# Patient Record
Sex: FEMALE | Race: BLACK OR AFRICAN AMERICAN
[De-identification: names, ages, dates, MRNs, and addresses within clinical notes are randomized per-mention and may not be internally consistent; named-entity substitution may affect disease eponyms.]

---

## 2017-04-22 ENCOUNTER — HOSPITAL ENCOUNTER (OUTPATIENT)
Dept: HOSPITAL 61 - MAMMO | Age: 62
Discharge: HOME | End: 2017-04-22
Attending: FAMILY MEDICINE
Payer: COMMERCIAL

## 2017-04-22 DIAGNOSIS — Z12.31: Primary | ICD-10-CM

## 2017-04-24 NOTE — RAD
EXAM: DIGITAL SCREEN BILAT W/CAD.



HISTORY: Screening.



COMPARISON: 12/29/2014.



FINDINGS:



Digital mammography was performed. Computer-aided detection (CAD) was

utilized.



The breast parenchyma is heterogeneously dense, which could reduce sensitivity

of mammography (tissue density C). No dominant suspicious mass, suspicious

microcalcifications, or architectural distortion is identified.



   



IMPRESSION: No mammographic evidence of malignancy.







BI-RADS CATEGORY: 1 NEGATIVE



RECOMMENDED FOLLOW-UP: 12M 12 MONTH FOLLOW-UP



PQRS compliance statement: Patient information was entered into a reminder

system with a target due date     for the next mammogram.



Mammography is a sensitive method for finding small breast cancers, but it

does not detect them all and is not a substitute for careful clinical

examination.  A negative mammogram does not negate a clinically suspicious

finding and should not result in delay in biopsying a clinically suspicious

abnormality.



"Our facility is accredited by the [employer] Mammography

Program."

## 2018-08-18 ENCOUNTER — HOSPITAL ENCOUNTER (OUTPATIENT)
Dept: HOSPITAL 61 - MAMMO | Age: 63
Discharge: HOME | End: 2018-08-18
Attending: FAMILY MEDICINE
Payer: COMMERCIAL

## 2018-08-18 DIAGNOSIS — Z12.31: Primary | ICD-10-CM

## 2018-08-18 PROCEDURE — 77067 SCR MAMMO BI INCL CAD: CPT

## 2018-08-21 NOTE — RAD
DATE: 8/18/2018



EXAM: DIGITAL SCREEN BILAT W/CAD



HISTORY: Routine screening



COMPARISON: 4/22/2017



This study was interpreted with the benefit of Computerized Aided Detection

(CAD).



The breast parenchyma shows scattered fibroglandular densities. Breast

parenchyma level B.



FINDINGS: No new or enlarging breast densities are seen.  Minimal benign type

calcification is present.  No suspicious microcalcifications have developed.  





IMPRESSION: Stable mammograms without evidence of malignancy.





BI-RADS CATEGORY: 2 BENIGN FINDING(S)



RECOMMENDED FOLLOW-UP: 12M 12 MONTH FOLLOW-UP



PQRS compliance statement: Patient information was entered into a reminder

system with a target due date     for the next mammogram.



Mammography is a sensitive method for finding small breast cancers, but it

does not detect them all and is not a substitute for careful clinical

examination.  A negative mammogram does not negate a clinically suspicious

finding and should not result in delay in biopsying a clinically suspicious

abnormality.



"Our facility is accredited by the [employer] Mammography

Program."

## 2019-11-16 ENCOUNTER — HOSPITAL ENCOUNTER (OUTPATIENT)
Dept: HOSPITAL 61 - MAMMO | Age: 64
Discharge: HOME | End: 2019-11-16
Attending: FAMILY MEDICINE
Payer: COMMERCIAL

## 2019-11-16 DIAGNOSIS — N64.89: ICD-10-CM

## 2019-11-16 DIAGNOSIS — Z12.31: Primary | ICD-10-CM

## 2019-11-16 PROCEDURE — 77067 SCR MAMMO BI INCL CAD: CPT

## 2019-11-18 NOTE — RAD
DATE: 11/16/2019.



EXAM: DIGITAL SCREEN BILAT W/CAD.



HISTORY: Routine mammographic screening.



COMPARISON: 8/18/2018.



This study was interpreted with the benefit of Computerized Aided Detection

(CAD).



FINDINGS:



Breast Density:  SCATTERED The breast parenchyma shows scattered

fibroglandular densities. Breast parenchyma level B..



A dense focus slightly medial to the right nipple may correspond with the

density at the nipple line on MLO view.



A small nodule medially on the left appears new but is superficial right.



Scattered and coarse calcifications are benign.



BI-RADS CATEGORY: 0 INCOMPLETE:  NEEDS ADDITIONAL IMAGING EVALUATION AND/OR

PRIOR MAMMOGRAMS FOR COMPARISON..



RECOMMENDED FOLLOW-UP: ADD ADDITIONAL IMAGING.

1. Spot compression of densities bilaterally as above. See annotations.

Sonography if necessary.



PQRS compliance statement: Patient information was entered into a reminder

system with a target due date (now) for the next mammogram.



Mammography is a sensitive method for finding small breast cancers, but it

does not detect them all and is not a substitute for careful clinical

examination.  A negative mammogram does not negate a clinically suspicious

finding and should not result in delay in biopsying a clinically suspicious

abnormality.



"Our facility is accredited by the [employer] Mammography

Program."

## 2019-12-18 ENCOUNTER — HOSPITAL ENCOUNTER (OUTPATIENT)
Dept: HOSPITAL 61 - MAMMO | Age: 64
Discharge: HOME | End: 2019-12-18
Attending: FAMILY MEDICINE
Payer: COMMERCIAL

## 2019-12-18 DIAGNOSIS — N60.42: ICD-10-CM

## 2019-12-18 DIAGNOSIS — N60.41: Primary | ICD-10-CM

## 2019-12-18 PROCEDURE — 77066 DX MAMMO INCL CAD BI: CPT

## 2019-12-18 PROCEDURE — 76641 ULTRASOUND BREAST COMPLETE: CPT

## 2019-12-18 NOTE — RAD
DATE: December 18, 2019



EXAM: DIGITAL DIAGNOSTIC BILATERAL, BREAST BILATERAL SONOGRAPHY



HISTORY: Further evaluation of new bilateral breast nodules seen on screening

mammogram.



COMPARISON: November 16, 2019



This study was interpreted with the benefit of Computerized Aided Detection

(CAD).



DIAGNOSTIC 2-D BILATERAL MAMMOGRAPHY



FINDINGS: Compression views and 90 degrees mediolateral view of the left

breast was performed. A small nodular density is seen medially within the left

breast. Sonography will be performed.



Compression views and 90 degrees mediolateral view of the right breast was

performed. A small nodular density is seen in the retroareolar region

anteriorly. Sonography will be performed.



RIGHT BREAST SONOGRAPHY: High-resolution sonography of the retroareolar region

of the right breast was performed. Mildly prominent retroareolar ductal

ectasia is seen. No other focal sonographic abnormality is seen.



LEFT BREAST SONOGRAPHY: High-resolution sonography of the medial one half of

the left breast was performed. Mild ductal ectasia is seen. No other focal

sonographic abnormality is evident.



IMPRESSION: Probable benign mammographic bilateral breast nodules. Recommend 6

month follow-up 2-D mammogram of both breasts.



BI-RADS CATEGORY: 3 PROBABLE BENIGN-SHORT TERM F/U



RECOMMENDED FOLLOW-UP: 6M 6 MONTH FOLLOW-UP



PQRS compliance statement: Patient information was entered into a reminder

system with a target due date June 18, 2020 for the next mammogram.



Mammography is a sensitive method for finding small breast cancers, but it

does not detect them all and is not a substitute for careful clinical

examination.  A negative mammogram does not negate a clinically suspicious

finding and should not result in delay in biopsying a clinically suspicious

abnormality.



"Our facility is accredited by the [employer] Mammography

Program."

## 2020-07-30 ENCOUNTER — HOSPITAL ENCOUNTER (OUTPATIENT)
Dept: HOSPITAL 61 - MAMMO | Age: 65
Discharge: HOME | End: 2020-07-30
Attending: FAMILY MEDICINE
Payer: COMMERCIAL

## 2020-07-30 DIAGNOSIS — R92.2: Primary | ICD-10-CM

## 2020-07-30 PROCEDURE — 77066 DX MAMMO INCL CAD BI: CPT

## 2020-07-30 NOTE — RAD
DATE: 7/30/2020 12:36 PM



EXAM: DIGITAL DIAGNOSTIC BILATERAL



HISTORY:  Six-month follow-up probably benign asymmetries bilaterally.



COMPARISON: Bilateral mammograms of 11/16/2019, 8/18/2018, 4/22/2017 and

12/29/2014. Bilateral diagnostic mammogram of 12/18/2019 also reviewed.



Bilateral full field craniocaudal and mediolateral oblique images were

obtained using digital technique. Bilateral ML views were also obtained.



This study was reviewed with Computerized Aided Detection (CAD).





FINDINGS:



Breast Density: SCATTERED  The breast parenchyma shows scattered

fibroglandular densities. Breast parenchyma level B





No suspicious masses, microcalcifications or architectural distortion is

present to suggest malignancy in either breast.

There is no persistent mammographic abnormality.

The visualized axillae are unremarkable. 



IMPRESSION: No mammographic evidence of malignancy. 



BI-RADS CATEGORY: 1 NEGATIVE



RECOMMENDED FOLLOW-UP: 12M 12 MONTH FOLLOW-UP Annual screening mammography is

recommended, unless clinically indicated sooner based on symptoms or change in

physical exam.



PQRS compliance statement: Patient information was entered into a reminder

system with a target due date for the next mammogram.



Mammography is a sensitive method for finding small breast cancers, but it

does not detect them all and is not a substitute for careful clinical

examination.  A negative mammogram does not negate a clinically suspicious

finding and should not result in delay in biopsying a clinically suspicious

abnormality.



"Our facility is accredited by the [employer] Mammography

Program."

## 2021-05-20 ENCOUNTER — HOSPITAL ENCOUNTER (OUTPATIENT)
Dept: HOSPITAL 61 - RAD | Age: 66
End: 2021-05-20
Attending: FAMILY MEDICINE
Payer: COMMERCIAL

## 2021-05-20 DIAGNOSIS — Z96.649: ICD-10-CM

## 2021-05-20 DIAGNOSIS — Z01.818: Primary | ICD-10-CM

## 2021-05-20 PROCEDURE — 71046 X-RAY EXAM CHEST 2 VIEWS: CPT

## 2021-05-20 NOTE — RAD
EXAM: Chest, 2 views.



HISTORY: Preoperative evaluation.



COMPARISON: None.



FINDINGS: 2 views of the chest are obtained. There is no infiltrate, pleural effusion or pneumothorax
. There is right middle lobe and lingular atelectasis or pleural parenchymal scarring. The heart is u
pper normal in size.



IMPRESSION: Suspected right middle lobe and lingular atelectasis or scarring. No acute pulmonary find
ing.



Electronically signed by: Brooklyn Pruett MD (5/20/2021 11:23 AM) [id number]

## 2021-09-16 ENCOUNTER — HOSPITAL ENCOUNTER (OUTPATIENT)
Dept: HOSPITAL 61 - MAMMO | Age: 66
End: 2021-09-16
Attending: FAMILY MEDICINE
Payer: COMMERCIAL

## 2021-09-16 DIAGNOSIS — Z12.31: Primary | ICD-10-CM

## 2021-09-16 PROCEDURE — 77067 SCR MAMMO BI INCL CAD: CPT

## 2021-09-16 PROCEDURE — 77063 BREAST TOMOSYNTHESIS BI: CPT

## 2021-09-16 NOTE — RAD
EXAM: Bilateral digital screening mammogram with tomosynthesis.



HISTORY: 66-year-old female presents for screening mammography.



TECHNIQUE: Full-field digital craniocaudal and mediolateral oblique 2D and 3D tomosynthesis images of
 both breasts are obtained for evaluation. Computer aided detection was applied.



COMPARISON: 7/30/2020 and 12/18/2019 and 11/16/2019 and 8/18/2018



BREAST PARENCHYMAL DENSITY: Level B - Scattered fibroglandular densities.



FINDINGS: There is no new suspicious mass, microcalcification or region of architectural distortion. 
There are multiple stable benign areas of nodularity and asymmetry within both breasts. There are a f
ew benign calcifications.



IMPRESSION: BI-RADS Category 2: Benign finding(s). 



RECOMMENDATION: Annual mammography is recommended.



If your mammogram demonstrates that you have dense breast tissue, which could hide abnormalities, and
 if you have other risk factors for breast cancer that have been identified, you might benefit from s
upplemental screening tests that may be suggested by your ordering physician.  Dense breast tissue, i
n and of itself, is a relatively common condition.  This information is not provided to cause undue c
oncern, but rather to raise your awareness and to promote discussion with your physician regarding th
e presence of other risk factors, in addition to dense breast tissue. A report of your mammography re
sults will be sent to you and your physician.  You should contact your physician if you have any ques
tions or concerns regarding this report.  



Mammography is a sensitive method for finding small breast cancers, but it does not detect them all a
nd is not a substitute for careful clinical examination.  A negative mammogram does not negate a clin
ically suspicious finding and should not result in delay in biopsying a clinically suspicious abnorma
lity.



PQRS compliance statement -  Patient information was entered into a reminder system with a target due
 date for the next mammogram. 



"Our facility is accredited by the [employer] Mammography Program."



Electronically signed by: Brooklyn Pruett MD (9/16/2021 10:22 AM) [id number]